# Patient Record
Sex: MALE | Race: WHITE | NOT HISPANIC OR LATINO | Employment: FULL TIME | URBAN - METROPOLITAN AREA
[De-identification: names, ages, dates, MRNs, and addresses within clinical notes are randomized per-mention and may not be internally consistent; named-entity substitution may affect disease eponyms.]

---

## 2018-04-18 ENCOUNTER — HOSPITAL ENCOUNTER (EMERGENCY)
Facility: HOSPITAL | Age: 48
Discharge: HOME | End: 2018-04-18
Attending: EMERGENCY MEDICINE | Admitting: EMERGENCY MEDICINE

## 2018-04-18 ENCOUNTER — APPOINTMENT (OUTPATIENT)
Dept: CT IMAGING | Facility: HOSPITAL | Age: 48
End: 2018-04-18

## 2018-04-18 VITALS
SYSTOLIC BLOOD PRESSURE: 168 MMHG | RESPIRATION RATE: 16 BRPM | TEMPERATURE: 97.9 F | BODY MASS INDEX: 29.82 KG/M2 | OXYGEN SATURATION: 97 % | WEIGHT: 225 LBS | HEART RATE: 76 BPM | DIASTOLIC BLOOD PRESSURE: 105 MMHG | HEIGHT: 73 IN

## 2018-04-18 DIAGNOSIS — R10.9 FLANK PAIN: Primary | ICD-10-CM

## 2018-04-18 DIAGNOSIS — R31.9 HEMATURIA, UNSPECIFIED TYPE: ICD-10-CM

## 2018-04-18 LAB
ALBUMIN SERPL-MCNC: 4.2 G/DL (ref 3.5–5.2)
ALBUMIN/GLOB SERPL: 1.4 G/DL
ALP SERPL-CCNC: 74 U/L (ref 39–117)
ALT SERPL W P-5'-P-CCNC: 30 U/L (ref 1–41)
ANION GAP SERPL CALCULATED.3IONS-SCNC: 10.6 MMOL/L
AST SERPL-CCNC: 20 U/L (ref 1–40)
BASOPHILS # BLD AUTO: 0.01 10*3/MM3 (ref 0–0.2)
BASOPHILS NFR BLD AUTO: 0.1 % (ref 0–1.5)
BILIRUB SERPL-MCNC: 0.5 MG/DL (ref 0.1–1.2)
BILIRUB UR QL STRIP: NEGATIVE
BUN BLD-MCNC: 14 MG/DL (ref 6–20)
BUN/CREAT SERPL: 14.6 (ref 7–25)
CALCIUM SPEC-SCNC: 8.9 MG/DL (ref 8.6–10.5)
CHLORIDE SERPL-SCNC: 102 MMOL/L (ref 98–107)
CLARITY UR: CLEAR
CO2 SERPL-SCNC: 26.4 MMOL/L (ref 22–29)
COLOR UR: YELLOW
CREAT BLD-MCNC: 0.96 MG/DL (ref 0.76–1.27)
DEPRECATED RDW RBC AUTO: 43.4 FL (ref 37–54)
EOSINOPHIL # BLD AUTO: 0.26 10*3/MM3 (ref 0–0.7)
EOSINOPHIL NFR BLD AUTO: 2.8 % (ref 0.3–6.2)
ERYTHROCYTE [DISTWIDTH] IN BLOOD BY AUTOMATED COUNT: 13 % (ref 11.5–14.5)
GFR SERPL CREATININE-BSD FRML MDRD: 83 ML/MIN/1.73
GLOBULIN UR ELPH-MCNC: 3.1 GM/DL
GLUCOSE BLD-MCNC: 94 MG/DL (ref 65–99)
GLUCOSE UR STRIP-MCNC: NEGATIVE MG/DL
HCT VFR BLD AUTO: 47.1 % (ref 40.4–52.2)
HGB BLD-MCNC: 15.8 G/DL (ref 13.7–17.6)
HGB UR QL STRIP.AUTO: NEGATIVE
HOLD SPECIMEN: NORMAL
HOLD SPECIMEN: NORMAL
IMM GRANULOCYTES # BLD: 0.03 10*3/MM3 (ref 0–0.03)
IMM GRANULOCYTES NFR BLD: 0.3 % (ref 0–0.5)
KETONES UR QL STRIP: NEGATIVE
LEUKOCYTE ESTERASE UR QL STRIP.AUTO: NEGATIVE
LIPASE SERPL-CCNC: 36 U/L (ref 13–60)
LYMPHOCYTES # BLD AUTO: 2.59 10*3/MM3 (ref 0.9–4.8)
LYMPHOCYTES NFR BLD AUTO: 28.2 % (ref 19.6–45.3)
MCH RBC QN AUTO: 31.1 PG (ref 27–32.7)
MCHC RBC AUTO-ENTMCNC: 33.5 G/DL (ref 32.6–36.4)
MCV RBC AUTO: 92.7 FL (ref 79.8–96.2)
MONOCYTES # BLD AUTO: 0.77 10*3/MM3 (ref 0.2–1.2)
MONOCYTES NFR BLD AUTO: 8.4 % (ref 5–12)
NEUTROPHILS # BLD AUTO: 5.51 10*3/MM3 (ref 1.9–8.1)
NEUTROPHILS NFR BLD AUTO: 60.2 % (ref 42.7–76)
NITRITE UR QL STRIP: NEGATIVE
PH UR STRIP.AUTO: 5.5 [PH] (ref 5–8)
PLATELET # BLD AUTO: 330 10*3/MM3 (ref 140–500)
PMV BLD AUTO: 10.4 FL (ref 6–12)
POTASSIUM BLD-SCNC: 4.3 MMOL/L (ref 3.5–5.2)
PROT SERPL-MCNC: 7.3 G/DL (ref 6–8.5)
PROT UR QL STRIP: NEGATIVE
RBC # BLD AUTO: 5.08 10*6/MM3 (ref 4.6–6)
SODIUM BLD-SCNC: 139 MMOL/L (ref 136–145)
SP GR UR STRIP: 1.03 (ref 1–1.03)
UROBILINOGEN UR QL STRIP: NORMAL
WBC NRBC COR # BLD: 9.17 10*3/MM3 (ref 4.5–10.7)
WHOLE BLOOD HOLD SPECIMEN: NORMAL
WHOLE BLOOD HOLD SPECIMEN: NORMAL

## 2018-04-18 PROCEDURE — 81003 URINALYSIS AUTO W/O SCOPE: CPT | Performed by: EMERGENCY MEDICINE

## 2018-04-18 PROCEDURE — 99283 EMERGENCY DEPT VISIT LOW MDM: CPT

## 2018-04-18 PROCEDURE — 25010000002 IOPAMIDOL 61 % SOLUTION: Performed by: EMERGENCY MEDICINE

## 2018-04-18 PROCEDURE — 83690 ASSAY OF LIPASE: CPT

## 2018-04-18 PROCEDURE — 85025 COMPLETE CBC W/AUTO DIFF WBC: CPT

## 2018-04-18 PROCEDURE — 36415 COLL VENOUS BLD VENIPUNCTURE: CPT

## 2018-04-18 PROCEDURE — 80053 COMPREHEN METABOLIC PANEL: CPT

## 2018-04-18 PROCEDURE — 74177 CT ABD & PELVIS W/CONTRAST: CPT

## 2018-04-18 RX ORDER — SODIUM CHLORIDE 0.9 % (FLUSH) 0.9 %
10 SYRINGE (ML) INJECTION AS NEEDED
Status: DISCONTINUED | OUTPATIENT
Start: 2018-04-18 | End: 2018-04-18 | Stop reason: HOSPADM

## 2018-04-18 RX ADMIN — IOPAMIDOL 85 ML: 612 INJECTION, SOLUTION INTRAVENOUS at 11:54

## 2018-04-18 NOTE — ED TRIAGE NOTES
Patient to er with c/o right flank pain for over a week. Patient seen at [facility] and sent her because the UA had blood in it.

## 2018-11-05 ENCOUNTER — APPOINTMENT (OUTPATIENT)
Dept: GENERAL RADIOLOGY | Facility: HOSPITAL | Age: 48
End: 2018-11-05

## 2018-11-05 PROCEDURE — 71046 X-RAY EXAM CHEST 2 VIEWS: CPT | Performed by: GENERAL PRACTICE

## 2023-08-21 ENCOUNTER — OFFICE VISIT (OUTPATIENT)
Dept: FAMILY MEDICINE CLINIC | Facility: CLINIC | Age: 53
End: 2023-08-21
Payer: COMMERCIAL

## 2023-08-21 ENCOUNTER — HOSPITAL ENCOUNTER (OUTPATIENT)
Dept: GENERAL RADIOLOGY | Facility: HOSPITAL | Age: 53
Discharge: HOME | End: 2023-08-21
Admitting: FAMILY MEDICINE
Payer: COMMERCIAL

## 2023-08-21 VITALS
DIASTOLIC BLOOD PRESSURE: 94 MMHG | OXYGEN SATURATION: 97 % | TEMPERATURE: 97.8 F | HEIGHT: 73 IN | WEIGHT: 232 LBS | HEART RATE: 90 BPM | SYSTOLIC BLOOD PRESSURE: 150 MMHG | BODY MASS INDEX: 30.75 KG/M2

## 2023-08-21 DIAGNOSIS — Z12.5 SCREENING FOR PROSTATE CANCER: ICD-10-CM

## 2023-08-21 DIAGNOSIS — Z00.00 ROUTINE ADULT HEALTH MAINTENANCE: Primary | ICD-10-CM

## 2023-08-21 DIAGNOSIS — M54.2 NECK PAIN ON LEFT SIDE: Primary | ICD-10-CM

## 2023-08-21 DIAGNOSIS — M54.2 NECK PAIN ON LEFT SIDE: ICD-10-CM

## 2023-08-21 PROCEDURE — 72050 X-RAY EXAM NECK SPINE 4/5VWS: CPT

## 2023-08-21 PROCEDURE — 99203 OFFICE O/P NEW LOW 30 MIN: CPT | Performed by: FAMILY MEDICINE

## 2023-08-21 RX ORDER — TIZANIDINE 4 MG/1
4 TABLET ORAL EVERY 6 HOURS PRN
Qty: 30 TABLET | Refills: 0 | Status: SHIPPED | OUTPATIENT
Start: 2023-08-21

## 2023-08-21 NOTE — PROGRESS NOTES
"  Subjective   Farhad Reynaga is a 52 y.o. male who is here for   Chief Complaint   Patient presents with    Neck Pain    Establish Care   .     History of Present Illness   Helping a friend dig a trench 3 weeks ago  Still with left neck , left upper back pain  None prior  No radicular pain in arm  Right side is normal.  Home treatments not helping.    New patient here today  His wife sees Cami ISRAEL     The following portions of the patient's history were reviewed and updated as appropriate: allergies, current medications, past family history, past medical history, past social history, past surgical history, and problem list.    Review of Systems    Objective   Vitals:    08/21/23 1334   BP: 150/94   Pulse: 90   Temp: 97.8 øF (36.6 øC)   SpO2: 97%   Weight: 105 kg (232 lb)   Height: 185.4 cm (72.99\")      Physical Exam  Musculoskeletal:      Cervical back: Tenderness present.      Thoracic back: Tenderness present.        Back:        Assessment & Plan   Diagnoses and all orders for this visit:    1. Neck pain on left side (Primary)  -     XR Spine Cervical Complete 4 or 5 View; Future  -     tiZANidine (Zanaflex) 4 MG tablet; Take 1 tablet by mouth Every 6 (Six) Hours As Needed for Muscle Spasms.  Dispense: 30 tablet; Refill: 0  -     Ambulatory Referral to Physical Therapy Evaluate and treat      There are no Patient Instructions on file for this visit.    Medications Discontinued During This Encounter   Medication Reason    azithromycin (ZITHROMAX) 250 MG tablet *Therapy completed    benzonatate (TESSALON) 200 MG capsule *Therapy completed    brompheniramine-pseudoephedrine-DM 30-2-10 MG/5ML syrup *Therapy completed        Return in about 3 months (around 11/21/2023) for Annual physical.    Dr. Flako Lucero  [facility]  [place], [place]    "

## 2023-08-23 ENCOUNTER — PATIENT ROUNDING (BHMG ONLY) (OUTPATIENT)
Dept: FAMILY MEDICINE CLINIC | Facility: CLINIC | Age: 53
End: 2023-08-23
Payer: COMMERCIAL

## 2023-08-24 NOTE — PROGRESS NOTES
Physical Therapy Initial Evaluation and Plan of Care    [address] [facility] [address]      Patient: Farhad Reynaga   : 1970  Diagnosis/ICD-10 Code:  Pain, neck [M54.2]  Referring practitioner: Flako Lucero MD  Date of Initial Visit: 2023  Today's Date: 2023  Patient seen for 1 sessions           Subjective Questionnaire: NDI: 13/50 or 26%      Subjective Evaluation    History of Present Illness  Mechanism of injury: Pt was helping a friend dig a trench 3 weeks ago and had increased neck pain on L side/shoulder blade following ax. Pt presents with residual with left neck, left upper back pain today. No radicular pain in arm. Muscle relaxers have helped. Pt has stopped working out due to fear of increasing pain the next day.         Patient Occupation: [employer]- Quality of life: excellent    Pain  Current pain ratin  At best pain ratin  At worst pain ratin  Quality: dull ache, tight and discomfort  Relieving factors: heat and ice (TENS, massage)  Aggravating factors: prolonged positioning, sleeping, overhead activity, lifting, movement and repetitive movement  Progression: worsening    Social Support  Lives with: spouse and young children    Hand dominance: ambidextrous (writes right handed)    Diagnostic Tests  X-ray: abnormal (Degenerative changes C5-6 and C6-7 otherwise normal.)    Treatments  Previous treatment: physical therapy  Patient Goals  Patient goals for therapy: decreased pain, improved balance, increased motion, increased strength, independence with ADLs/IADLs, return to sport/leisure activities and return to work           Objective        Special Questions  Patient is experiencing disturbed sleep.       Palpation   Left   Hypertonic in the cervical interspinals, cervical paraspinals, levator scapulae, scalenes and upper trapezius.   Tenderness of the cervical interspinals, cervical paraspinals, levator scapulae, scalenes and upper  trapezius.     Cervical Spine Comments  Left cervical interspinals: C3-C6-severe.     Tenderness   Cervical Spine   Tenderness in the left transverse process.     Additional Tenderness Details  C3-C6    Neurological Testing     Sensation   Cervical/Thoracic   Left   Intact: light touch    Right   Intact: light touch    Additional Neurological Details  Pt denies numbness/tingling down BLE    Active Range of Motion   Cervical/Thoracic Spine   Cervical    Flexion: 20 degrees with pain  Extension: 40 degrees   Left lateral flexion: 25 degrees with pain  Right lateral flexion: 25 degrees with pain  Left rotation: 60 degrees with pain  Right rotation: 74 degrees     Strength/Myotome Testing   Cervical Spine     Left   Normal strength    Right   Normal strength    Left Shoulder     Planes of Motion   Left shoulder forward flexion strength: increased pain neck.     Tests   Cervical     Left   Negative alar ligament integrity, active compression ([place]), cervical distraction and Spurling's sign.     Additional Tests Details  -vertebral artery testing B        Assessment & Plan       Assessment  Impairments: abnormal muscle firing, abnormal muscle tone, abnormal or restricted ROM, activity intolerance, impaired physical strength, lacks appropriate home exercise program and pain with function   Functional limitations: carrying objects, lifting, sleeping, pulling, uncomfortable because of pain, moving in bed, sitting, stooping, reaching overhead and unable to perform repetitive tasks   Assessment details: Farhad Reynaga is a 52 y.o. year-old male referred to physical therapy with L sided neck pain. He presents with a stable clinical presentation.  He has no comorbidities or personal factors that may affect his progress in the plan of care. Signs and symptoms are consistent with physical therapy diagnosis of neck pain due to limited cervical ROM, weakness, muscle tightness. Patient is appropriate for skilled physical  therapy in order to reduce pain and increase ease with daily mobility. During evaluation, pt educated on anatomy, goal of interventions, positioning and body mechanics to promote healthy lifestyle and improve quality of life.    Prognosis: good    Goals  Plan Goals: Plan Goals: STG 3 weeks:  1.  Decrease tightness and tenderness of L UT, SCM, levator scap, and scalenes to mild by palpation.  2.  Increase cervical AROM in sidebending 30 degrees B  3.  Increase cervical AROM in rotation to 80 degrees B  4.  Increase cervical AROM in flexion to 40 degrees    LTG 6 weeks:  1.  Decrease tenderness of UT, SCM, levator scap, and scalenes to minimal to none by palpation.  2.  No neck pain with working on the computer or with work duties at [employer]  3.  Pt will improve NDI score to 5/50 or 10%  4.  Resume ADL's and working out without symptoms    Plan  Therapy options: will be seen for skilled therapy services  Planned modality interventions: cryotherapy, thermotherapy (hydrocollator packs), TENS, iontophoresis, traction, ultrasound and dry needling  Planned therapy interventions: abdominal trunk stabilization, balance/weight-bearing training, ADL retraining, body mechanics training, flexibility, functional ROM exercises, home exercise program, IADL retraining, joint mobilization, manual therapy, neuromuscular re-education, postural training, soft tissue mobilization, spinal/joint mobilization, strengthening, stretching, therapeutic activities and transfer training  Frequency: 2x week  Treatment plan discussed with: patient  Plan details: 2 times per week for 6 weeks    Pt wants to try dry needling at next weeks session; had dry needling years ago for shoulder issue and it was successful      Visit Diagnoses:    ICD-10-CM ICD-9-CM   1. Pain, neck  M54.2 723.1   2. Muscle tightness  M62.89 728.9   3. Painful cervical ROM  M54.2 723.1       Timed:  Manual Therapy:    10     mins  12858;  Therapeutic Exercise:    -     mins   78800;     Neuromuscular Cristiano:    -    mins  34733;    Therapeutic Activity:     -     mins  15101;     Gait Training:      -     mins  44949;     Ultrasound:     8     mins  26434;    Electrical Stimulation:    -     mins  41649 ( );    Low Eval                       20      Mins  85672  Mod Eval                        -     Mins  81753  High Eval                       -     Mins  60565    Untimed:  Electrical Stimulation:    -     mins  61320 ( );  Mechanical Traction:    -     mins  62566;     Timed Treatment:   18   mins   Total Treatment:     45   mins    PT SIGNATURE: [employer] [place] license: [id number]  DATE TREATMENT INITIATED: 8/25/2023    Initial Certification  Certification Period: 11/23/2023  I certify that the therapy services are furnished while this patient is under my care.  The services outlined above are required by this patient, and will be reviewed every 90 days.     PHYSICIAN: Flako Lucero MD      DATE:     Please sign and return via fax to [phone]. Thank you, [facility].

## 2023-08-25 ENCOUNTER — TREATMENT (OUTPATIENT)
Dept: PHYSICAL THERAPY | Facility: CLINIC | Age: 53
End: 2023-08-25
Payer: COMMERCIAL

## 2023-08-25 DIAGNOSIS — M54.2 PAIN, NECK: Primary | ICD-10-CM

## 2023-08-25 DIAGNOSIS — M62.89 MUSCLE TIGHTNESS: ICD-10-CM

## 2023-08-25 DIAGNOSIS — M54.2 PAINFUL CERVICAL ROM: ICD-10-CM

## 2023-08-29 ENCOUNTER — TREATMENT (OUTPATIENT)
Dept: PHYSICAL THERAPY | Facility: CLINIC | Age: 53
End: 2023-08-29
Payer: COMMERCIAL

## 2023-08-29 DIAGNOSIS — M54.2 PAIN, NECK: Primary | ICD-10-CM

## 2023-08-29 DIAGNOSIS — M54.2 PAINFUL CERVICAL ROM: ICD-10-CM

## 2023-08-29 DIAGNOSIS — M62.89 MUSCLE TIGHTNESS: ICD-10-CM

## 2023-08-29 NOTE — PROGRESS NOTES
Physical Therapy Daily Treatment Note    [facility]  [address] [place], [place] [id number] [phone] (phone)  [phone] (fax)    Patient: Farhad Reynaga   : 1970  Diagnosis/ICD-10 Code:  Pain, neck [M54.2]  Referring practitioner: Flako Lucero MD  Date of Initial Visit: Type: THERAPY  Noted: 2023  Today's Date: 2023  Patient seen for 2 sessions           Subjective   Patient reports he had a lot of success with dry needling in the past. He's hopeful it'll help him again.     Objective     See Exercise, Manual, and Modality Logs for complete treatment.     Dry needling, using threading and direct techniques, obtaining written and verbal consent to treat after discussing benefits and risks.   Patient position during treatment: Supine. Muscles treated: L upper trap. Response: Positive twitch response. Clean needle technique observed at all times, precautions for lung fields, neurovascular structures observed. Manual palpation and assessment performed before, during, and after session.     Assessment/Plan  Patient consented to trial of dry needling. Able to achieve multiple strong twitches in middle of L upper trap muscle belly. Followed up with STM and US to help further relax muscle and educated patient on utilizing heat and tylenol PRN for pain relief.          Timed:    Manual Therapy:    10     mins  05238;  Therapeutic Exercise:         mins  46676;     Neuromuscular Cristiano:        mins  83451;    Therapeutic Activity:          mins  87441;     Gait Training:           mins  69444;     Ultrasound:     8     mins  11189;    Electrical Stimulation:         mins  95238 ( );  Iontophoresis         mins 98959;  Aquatic Therapy         mins 71345;    Untimed:  Electrical Stimulation:         mins  34938 ( );  Traction:         mins  83714;   Dry Needling   (1-2 muscles)       mins 41756 (Self-pay)  Dry Needling (3-4 muscles)        mins   (Self-pay)  Dry Needling Trial      10    mins DRYNDLTRIAL  (No Charge)    Timed Treatment:   18   mins   Total Treatment:     40   mins    Tara Burgos PT  Physical Therapist    [place] License:[id number]

## 2023-09-01 ENCOUNTER — TREATMENT (OUTPATIENT)
Dept: PHYSICAL THERAPY | Facility: CLINIC | Age: 53
End: 2023-09-01
Payer: COMMERCIAL

## 2023-09-01 DIAGNOSIS — M62.89 MUSCLE TIGHTNESS: ICD-10-CM

## 2023-09-01 DIAGNOSIS — M54.2 PAIN, NECK: Primary | ICD-10-CM

## 2023-09-01 DIAGNOSIS — M54.2 PAINFUL CERVICAL ROM: ICD-10-CM

## 2023-09-01 NOTE — PROGRESS NOTES
Extremely sore        [facility] [place]  [address]  [place] [address]  Phone [phone]  Fax [phone]      Physical Therapy Daily Treatment Note      Patient: Farhad Reynaga   : 1970  Referring practitioner: Flako Lucero MD  Date of Initial Visit: Type: THERAPY  Noted: 2023  Today's Date: 2023  Patient seen for 3 sessions       Visit Diagnoses:    ICD-10-CM ICD-9-CM   1. Pain, neck  M54.2 723.1   2. Muscle tightness  M62.89 728.9   3. Painful cervical ROM  M54.2 723.1       Farhad Reynaga reports: extreme soreness from needling.  Better yesterday and then severe last night.  I still want to the needling but I'll wait until next week.    Subjective     Objective   See Exercise, Manual, and Modality Logs for complete treatment.   -spurling's  Good rotation to left but some stiffness mid cervical with gentle mobilization    Assessment/Plan  No guarding upper trap or paraspinals today but very sore with some tenderness.  Patient shown rows and lat pulls to offset upper trap tendency to pull superiorly.   Gentle cervical rom added with manual and was well tolerated.    Timed:         Manual Therapy:    15     mins  36088;     Therapeutic Exercise:    10     mins  74298;     Neuromuscular Cristiano:    -    mins  45706;    Therapeutic Activity:     -     mins  20899;     Gait Training:      -     mins  18501;     Ultrasound:     8     mins  16588;    Ionto                               -    mins   41058  Self Care                       -     mins   32909  Orthotic training    -     mins 71832      Un-Timed:  Electrical Stimulation:    -     mins  31091 ( );  Dry Needling     -     mins self-pay  Traction     -     mins 70412      Timed Treatment:33  mins   Total Treatment:     45   mins    Sharon Castillo PT, [place]  [place] License: [id number]

## 2023-09-05 ENCOUNTER — TREATMENT (OUTPATIENT)
Dept: PHYSICAL THERAPY | Facility: CLINIC | Age: 53
End: 2023-09-05
Payer: COMMERCIAL

## 2023-09-05 DIAGNOSIS — M62.89 MUSCLE TIGHTNESS: ICD-10-CM

## 2023-09-05 DIAGNOSIS — M54.2 PAINFUL CERVICAL ROM: ICD-10-CM

## 2023-09-05 DIAGNOSIS — M54.2 PAIN, NECK: Primary | ICD-10-CM

## 2023-09-05 NOTE — PROGRESS NOTES
Physical Therapy Daily Treatment Note      Patient: Farhad Reynaga   : 1970  Referring practitioner: No ref. provider found  Date of Initial Visit: Type: THERAPY  Noted: 2023  Today's Date: 2023  Patient seen for 4 sessions         Farhad Reynaga reports: really sore from needling for 2-3 days; felt pop last night pushing up in bed with sig relief in neck pain.               Objective   See Exercise, Manual, and Modality Logs for complete treatment.       Assessment/Plan  Continued manual STM and stretches along with US to L cervical muscles; decreased hypertonicity compared to evaluation and decreased tenderness to palpation. May consider dry needling again in the future; however pt with improved pain since yesterday. Pt educated on importance of scapular strengthening for muscle balance especially with return to gym.       Progress per Plan of Care and Progress strengthening /stabilization /functional activity           Timed:  Manual Therapy:    12     mins  08906;  Therapeutic Exercise:    15     mins  43169;     Neuromuscular Cristiano:    -    mins  26171;    Therapeutic Activity:     -     mins  33118;     Gait Training:      -     mins  74829;     Ultrasound:     8     mins  23390;      Untimed:  Electrical Stimulation:    -     mins  29061 ( );  Mechanical Traction:    -     mins  93565;   Dry needling:      -     mins  00389/    Timed Treatment:   35   mins   Total Treatment:     45   mins  Aminta Zhou PT  Physical Therapist    License #: [id number]

## 2023-09-08 ENCOUNTER — TREATMENT (OUTPATIENT)
Dept: PHYSICAL THERAPY | Facility: CLINIC | Age: 53
End: 2023-09-08
Payer: COMMERCIAL

## 2023-09-08 DIAGNOSIS — M54.2 PAIN, NECK: Primary | ICD-10-CM

## 2023-09-08 DIAGNOSIS — M54.2 PAINFUL CERVICAL ROM: ICD-10-CM

## 2023-09-08 DIAGNOSIS — M62.89 MUSCLE TIGHTNESS: ICD-10-CM

## 2023-09-08 NOTE — PROGRESS NOTES
Physical Therapy Daily Treatment Note      Patient: Farhad Reynaga   : 1970  Referring practitioner: Flako Lucero MD  Date of Initial Visit: Type: THERAPY  Noted: 2023  Today's Date: 2023  Patient seen for 5 sessions         Farhad Reynaga reports: pain still lingering in L neck area; better but still there.               Objective   See Exercise, Manual, and Modality Logs for complete treatment.       Assessment/Plan  Pt willing to do dry needling again despite soreness from first session and trial of cervical traction today. Relief with manual cervical traction/SOR. Noted tenderness to palpation at C3-C5 L with STM. Clean needle technique and gloves used at all times. Precautions utilized for lung fields and neurovascular structures. Positive twitch response. No adverse response notedHeat after tx today and pt instructed to heat again tonight. Assess response to dry needling and trial of traction next session.        Progress per Plan of Care and Progress strengthening /stabilization /functional activity           Timed:  Manual Therapy:    12     mins  66529;  Therapeutic Exercise:    -     mins  90904;     Neuromuscular Cristiano:    -    mins  47732;    Therapeutic Activity:     -     mins  29385;     Gait Training:      -     mins  28446;     Ultrasound:     -     mins  86281;      Untimed:  Electrical Stimulation:    -     mins  24652 ( );  Mechanical Traction:    10     mins  21568;   Dry needling:      15     mins  84934/    Timed Treatment:   12   mins   Total Treatment:     50   mins  Aminta Zhou PT  Physical Therapist    License #: [id number]

## 2023-09-11 ENCOUNTER — TELEPHONE (OUTPATIENT)
Dept: ORTHOPEDICS | Facility: OTHER | Age: 53
End: 2023-09-11
Payer: COMMERCIAL

## 2023-09-14 ENCOUNTER — TREATMENT (OUTPATIENT)
Dept: PHYSICAL THERAPY | Facility: CLINIC | Age: 53
End: 2023-09-14
Payer: COMMERCIAL

## 2023-09-14 DIAGNOSIS — M62.89 MUSCLE TIGHTNESS: ICD-10-CM

## 2023-09-14 DIAGNOSIS — M54.2 PAINFUL CERVICAL ROM: ICD-10-CM

## 2023-09-14 DIAGNOSIS — M54.2 PAIN, NECK: Primary | ICD-10-CM

## 2023-09-14 NOTE — PROGRESS NOTES
Physical Therapy Daily Treatment Note      Patient: Farhad Reynaga   : 1970  Referring practitioner: Flako Lucero MD  Date of Initial Visit: Type: THERAPY  Noted: 2023  Today's Date: 2023  Patient seen for 6 sessions         Farhad Reynaga reports: he is feeling good; sore after needling but only for about a day, worse on Monday but really good the past 3 days.        Objective   See Exercise, Manual, and Modality Logs for complete treatment.       Assessment/Plan  Pt responding well to tx; minimal hypertonicity with palpation to L cervical muscles and no reports of pain with palpation or manual tx. Pt I with HEP. Possible discharge next session pending pt's response/pain level.      Progress per Plan of Care and Progress strengthening /stabilization /functional activity         Timed:  Manual Therapy:    15     mins  65254;  Therapeutic Exercise:    -     mins  64698;     Neuromuscular Cristiano:    -    mins  25441;    Therapeutic Activity:     -     mins  91453;     Gait Training:      -     mins  89856;     Ultrasound:     8     mins  82470;      Untimed:  Electrical Stimulation:    -     mins  13727 ( );  Mechanical Traction:    -     mins  88227;   Dry needling:      -     mins  90406/    Timed Treatment:   23   mins   Total Treatment:     35   mins    Aminta Zhou PT  Physical Therapist    License #: [id number]

## 2023-09-19 ENCOUNTER — TELEPHONE (OUTPATIENT)
Dept: PHYSICAL THERAPY | Facility: CLINIC | Age: 53
End: 2023-09-19
Payer: COMMERCIAL

## 2023-09-19 NOTE — TELEPHONE ENCOUNTER
Caller: Farhad Reynaga    Relationship: Self         What was the call regarding: HAS TO WORK

## 2025-04-12 NOTE — ED PROVIDER NOTES
[facility] EMERGENCY DEPARTMENT ENCOUNTER    CHIEF COMPLAINT  Chief Complaint: right flank pain  History given by: patient  History limited by: nothing  [facility] Room Number: 45/45  PMD: Roberto Choudhury MD      HPI:  Pt is a 47 y.o. male who presents complaining of right flank pain for the last 7 days. It was intermittent but has become more constant over the past couple of days. He describes the pain as a mild, dull ache. Pt had a urine sample done at [facility] just prior to arrival and they sent him here because there was blood in the urine. Pt denies changes in appetite, nausea, or vomiting. Pt states that laying down seems to alleviate his pain and sitting up aggravates the pain. He also states that the pain has started to radiate into his groin today, but he denies testicular pain or swelling.     Onset: gradual  Duration: 7 days  Severity: mild  Associated symptoms: none  Previous treatment: Pt had a urine sample done at [facility] just prior to arrival and they sent him here because there was blood in the urine.    PAST MEDICAL HISTORY  Active Ambulatory Problems     Diagnosis Date Noted   • No Active Ambulatory Problems     Resolved Ambulatory Problems     Diagnosis Date Noted   • No Resolved Ambulatory Problems     No Additional Past Medical History       PAST SURGICAL HISTORY  History reviewed. No pertinent surgical history.    FAMILY HISTORY  History reviewed. No pertinent family history.    SOCIAL HISTORY  Social History     Social History   • Marital status:      Spouse name: N/A   • Number of children: N/A   • Years of education: N/A     Occupational History   • Not on file.     Social History Main Topics   • Smoking status: Never Smoker   • Smokeless tobacco: Not on file   • Alcohol use No   • Drug use: No   • Sexual activity: Not on file     Other Topics Concern   • Not on file     Social History Narrative   • No narrative on file       ALLERGIES  Review of patient's  allergies indicates no known allergies.    REVIEW OF SYSTEMS  Review of Systems   Constitutional: Negative for activity change, appetite change and fever.   HENT: Negative for congestion and sore throat.    Eyes: Negative.    Respiratory: Negative for cough and shortness of breath.    Cardiovascular: Negative for chest pain and leg swelling.   Gastrointestinal: Negative for abdominal pain, diarrhea and vomiting.   Endocrine: Negative.    Genitourinary: Positive for flank pain (right flank). Negative for decreased urine volume and dysuria.   Musculoskeletal: Negative for neck pain.   Skin: Negative for rash and wound.   Allergic/Immunologic: Negative.    Neurological: Negative for weakness, numbness and headaches.   Hematological: Negative.    Psychiatric/Behavioral: Negative.    All other systems reviewed and are negative.      PHYSICAL EXAM  ED Triage Vitals   Temp Heart Rate Resp BP SpO2   04/18/18 1011 04/18/18 1011 04/18/18 1011 04/18/18 1028 04/18/18 1011   97.9 °F (36.6 °C) 92 16 (!) 155/107 98 %      Temp src Heart Rate Source Patient Position BP Location FiO2 (%)   04/18/18 1011 -- 04/18/18 1028 -- --   Tympanic  Sitting         Physical Exam   Constitutional: He is oriented to person, place, and time and well-developed, well-nourished, and in no distress.   HENT:   Head: Normocephalic and atraumatic.   Eyes: EOM are normal. Pupils are equal, round, and reactive to light.   Neck: Normal range of motion. Neck supple.   Cardiovascular: Normal rate, regular rhythm and normal heart sounds.    Pulses:       Dorsalis pedis pulses are 2+ on the right side, and 2+ on the left side.        Posterior tibial pulses are 2+ on the right side, and 2+ on the left side.   Pulmonary/Chest: Effort normal and breath sounds normal. No respiratory distress.   Abdominal: Soft. There is no tenderness. There is no rebound and no guarding.   Musculoskeletal: Normal range of motion. He exhibits no edema.   Pain is located in the  posterior iliac wing, right of midline. No pain with straight leg raise bilaterally   Neurological: He is alert and oriented to person, place, and time. He has normal sensation and normal strength.   Skin: Skin is warm and dry.   Psychiatric: Mood and affect normal.   Nursing note and vitals reviewed.      LAB RESULTS  Lab Results (last 24 hours)     Procedure Component Value Units Date/Time    POCT Urinalysis (automated dipstick) [[id number]]  (Abnormal) Collected:  04/18/18 0920    Specimen:  Urine Updated:  04/18/18 0922     Color Yellow     Clarity, UA Clear     Glucose, UA Negative mg/dL      Bilirubin Negative     Ketones, UA Negative     Specific Gravity  1.025     Blood, UA Trace (A)     pH, Urine 6.0     Protein, POC Negative mg/dL      Urobilinogen, UA Normal     Leukocytes Negative     Nitrite, UA Negative    CBC & Differential [[id number]] Collected:  04/18/18 1036    Specimen:  Blood Updated:  04/18/18 1044    Narrative:       The following orders were created for panel order CBC & Differential.  Procedure                               Abnormality         Status                     ---------                               -----------         ------                     CBC Auto Differential[[id number]]        Normal              Final result                 Please view results for these tests on the individual orders.    Comprehensive Metabolic Panel [[id number]] Collected:  04/18/18 1036    Specimen:  Blood Updated:  04/18/18 1107     Glucose 94 mg/dL      BUN 14 mg/dL      Creatinine 0.96 mg/dL      Sodium 139 mmol/L      Potassium 4.3 mmol/L      Chloride 102 mmol/L      CO2 26.4 mmol/L      Calcium 8.9 mg/dL      Total Protein 7.3 g/dL      Albumin 4.20 g/dL      ALT (SGPT) 30 U/L      AST (SGOT) 20 U/L      Alkaline Phosphatase 74 U/L      Total Bilirubin 0.5 mg/dL      eGFR Non African Amer 83 mL/min/1.73      Globulin 3.1 gm/dL      A/G Ratio 1.4 g/dL      BUN/Creatinine Ratio 14.6     Anion Gap 10.6  mmol/L     Lipase [[id number]]  (Normal) Collected:  04/18/18 1036    Specimen:  Blood Updated:  04/18/18 1107     Lipase 36 U/L     CBC Auto Differential [[id number]]  (Normal) Collected:  04/18/18 1036    Specimen:  Blood Updated:  04/18/18 1044     WBC 9.17 10*3/mm3      RBC 5.08 10*6/mm3      Hemoglobin 15.8 g/dL      Hematocrit 47.1 %      MCV 92.7 fL      MCH 31.1 pg      MCHC 33.5 g/dL      RDW 13.0 %      RDW-SD 43.4 fl      MPV 10.4 fL      Platelets 330 10*3/mm3      Neutrophil % 60.2 %      Lymphocyte % 28.2 %      Monocyte % 8.4 %      Eosinophil % 2.8 %      Basophil % 0.1 %      Immature Grans % 0.3 %      Neutrophils, Absolute 5.51 10*3/mm3      Lymphocytes, Absolute 2.59 10*3/mm3      Monocytes, Absolute 0.77 10*3/mm3      Eosinophils, Absolute 0.26 10*3/mm3      Basophils, Absolute 0.01 10*3/mm3      Immature Grans, Absolute 0.03 10*3/mm3     Urinalysis With / Microscopic If Indicated - Urine, Clean Catch [[id number]]  (Normal) Collected:  04/18/18 1306    Specimen:  Urine from Urine, Clean Catch Updated:  04/18/18 1321     Color, UA Yellow     Appearance, UA Clear     pH, UA 5.5     Specific Gravity, UA 1.026     Glucose, UA Negative     Ketones, UA Negative     Bilirubin, UA Negative     Blood, UA Negative     Protein, UA Negative     Leuk Esterase, UA Negative     Nitrite, UA Negative     Urobilinogen, UA 0.2 E.U./dL    Narrative:       Urine microscopic not indicated.          I ordered the above labs and reviewed the results    RADIOLOGY  CT Abdomen Pelvis With Contrast   Preliminary Result   1.  No acute abnormality within the abdomen or pelvis.   2.  Horseshoe kidney.       These findings were discussed with Dr. Snow by telephone.       Radiation dose reduction techniques were utilized, including automated   exposure control and exposure modulation based on body size.                 I ordered the above noted radiological studies. Interpreted by radiologist. Discussed with radiologist (  David). Reviewed by me in PACS.       PROCEDURES  Procedures      PROGRESS AND CONSULTS  ED Course     1030  Ordered labs for further evaluation.    1129  Ordered UA and CT abd/pelvis for further evaluation.     1339  Rechecked the patient and he is resting comfortably. Discussed the patient's pertinent labs and imaging results, including negative CT abd/pelvis and unremarkable UA,. Discussed plan to discharge the patient home and recommended follow up with his urologist. Patient agrees with the plan and all questions were addressed.     Latest vital signs   BP- (!) 144/104 HR- 74 Temp- 97.9 °F (36.6 °C) (Tympanic) O2 sat- 96%      MEDICAL DECISION MAKING  Results were reviewed/discussed with the patient and they were also made aware of online access. Pt also made aware that some labs, such as cultures, will not be resulted during ER visit and follow up with PMD is necessary.     MDM  Number of Diagnoses or Management Options     Amount and/or Complexity of Data Reviewed  Clinical lab tests: ordered and reviewed (Labs are unremarkable.)  Tests in the radiology section of CPT®: ordered and reviewed (CT abd/pelvis shows nothing acute.)           DIAGNOSIS  Final diagnoses:   Flank pain   Hematuria, unspecified type       DISPOSITION  DISCHARGE    Patient discharged in stable condition.    Reviewed implications of results, diagnosis, meds, responsibility to follow up, warning signs and symptoms of possible worsening, potential complications and reasons to return to ER, including new or worsening symptoms.    Patient/Family voiced understanding of above instructions.    Discussed plan for discharge, as there is no emergent indication for admission. Patient referred to primary care provider for BP management due to today's BP. Pt/family is agreeable and understands need for follow up and repeat testing.  Pt is aware that discharge does not mean that nothing is wrong but it indicates no emergency is present that  abdominal pain  requires admission and they must continue care with follow-up as given below or physician of their choice.     FOLLOW-UP  Olu Heath MD  [address] [place] [address] [place] [id number] [phone]      Call for appointment in next couple of days., Return if pain worsens, If symptoms worsen, shortness of breath, fever, any concerns    Roberto Choudhury MD  [address] [facility] [id number] [phone]    In 1 week  Return if pain worsens, If symptoms worsen, shortness of breath, fever, any concerns         Medication List      No changes were made to your prescriptions during this visit.           Latest Documented Vital Signs:  As of 1:46 PM  BP- (!) 144/104 HR- 74 Temp- 97.9 °F (36.6 °C) (Tympanic) O2 sat- 96%    --  Documentation assistance provided by marni Hendricks for Dr. Snow.  Information recorded by the scribe was done at my direction and has been verified and validated by me       Evonne Hendricks  04/18/18 1349       Bryson Snow MD  04/18/18 1934